# Patient Record
Sex: MALE | Race: WHITE | NOT HISPANIC OR LATINO | Employment: OTHER | URBAN - METROPOLITAN AREA
[De-identification: names, ages, dates, MRNs, and addresses within clinical notes are randomized per-mention and may not be internally consistent; named-entity substitution may affect disease eponyms.]

---

## 2023-10-27 ENCOUNTER — LAB (OUTPATIENT)
Dept: LAB | Facility: LAB | Age: 68
End: 2023-10-27
Payer: COMMERCIAL

## 2023-10-27 DIAGNOSIS — E78.00 PURE HYPERCHOLESTEROLEMIA, UNSPECIFIED: Primary | ICD-10-CM

## 2023-10-27 LAB
ALBUMIN SERPL BCP-MCNC: 4.4 G/DL (ref 3.4–5)
ALP SERPL-CCNC: 36 U/L (ref 33–136)
ALT SERPL W P-5'-P-CCNC: 12 U/L (ref 10–52)
ANION GAP SERPL CALC-SCNC: 13 MMOL/L (ref 10–20)
AST SERPL W P-5'-P-CCNC: 12 U/L (ref 9–39)
BILIRUB SERPL-MCNC: 1.7 MG/DL (ref 0–1.2)
BUN SERPL-MCNC: 14 MG/DL (ref 6–23)
CALCIUM SERPL-MCNC: 9.3 MG/DL (ref 8.6–10.3)
CHLORIDE SERPL-SCNC: 105 MMOL/L (ref 98–107)
CHOLEST SERPL-MCNC: 153 MG/DL (ref 0–199)
CHOLESTEROL/HDL RATIO: 2.4
CO2 SERPL-SCNC: 27 MMOL/L (ref 21–32)
CREAT SERPL-MCNC: 1.13 MG/DL (ref 0.5–1.3)
GFR SERPL CREATININE-BSD FRML MDRD: 71 ML/MIN/1.73M*2
GLUCOSE SERPL-MCNC: 103 MG/DL (ref 74–99)
HDLC SERPL-MCNC: 63.2 MG/DL
LDLC SERPL CALC-MCNC: 70 MG/DL
NON HDL CHOLESTEROL: 90 MG/DL (ref 0–149)
POTASSIUM SERPL-SCNC: 4.5 MMOL/L (ref 3.5–5.3)
PROT SERPL-MCNC: 6.7 G/DL (ref 6.4–8.2)
SODIUM SERPL-SCNC: 140 MMOL/L (ref 136–145)
TRIGL SERPL-MCNC: 100 MG/DL (ref 0–149)
VLDL: 20 MG/DL (ref 0–40)

## 2023-10-27 PROCEDURE — 80053 COMPREHEN METABOLIC PANEL: CPT

## 2023-10-27 PROCEDURE — 80061 LIPID PANEL: CPT

## 2023-10-27 PROCEDURE — 36415 COLL VENOUS BLD VENIPUNCTURE: CPT

## 2023-11-03 ENCOUNTER — APPOINTMENT (OUTPATIENT)
Dept: CARDIOLOGY | Facility: CLINIC | Age: 68
End: 2023-11-03
Payer: COMMERCIAL

## 2023-11-15 PROBLEM — R42 DIZZINESS: Status: ACTIVE | Noted: 2023-11-15

## 2023-11-15 PROBLEM — H93.13 SUBJECTIVE TINNITUS OF BOTH EARS: Status: ACTIVE | Noted: 2023-11-15

## 2023-11-15 PROBLEM — I25.10 CORONARY ARTERIOSCLEROSIS: Status: ACTIVE | Noted: 2023-11-15

## 2023-11-15 PROBLEM — I25.10 CORONARY ARTERIOSCLEROSIS: Chronic | Status: ACTIVE | Noted: 2023-11-15

## 2023-11-15 PROBLEM — H90.3 BILATERAL SENSORINEURAL HEARING LOSS: Status: ACTIVE | Noted: 2023-11-15

## 2023-11-15 PROBLEM — S39.012A ACUTE MYOFASCIAL STRAIN OF LUMBAR REGION: Status: ACTIVE | Noted: 2023-11-15

## 2023-11-15 PROBLEM — R25.1 TREMOR OF BOTH HANDS: Status: ACTIVE | Noted: 2023-11-15

## 2023-11-15 PROBLEM — R42 ORTHOSTATIC LIGHTHEADEDNESS: Status: ACTIVE | Noted: 2023-11-15

## 2023-11-15 PROBLEM — I71.20 THORACIC AORTIC ANEURYSM (CMS-HCC): Chronic | Status: ACTIVE | Noted: 2023-11-15

## 2023-11-15 PROBLEM — I71.20 THORACIC AORTIC ANEURYSM (CMS-HCC): Status: ACTIVE | Noted: 2023-11-15

## 2023-11-18 PROBLEM — R25.1 TREMOR OF BOTH HANDS: Status: RESOLVED | Noted: 2023-11-15 | Resolved: 2023-11-18

## 2023-11-18 PROBLEM — H90.3 BILATERAL SENSORINEURAL HEARING LOSS: Status: RESOLVED | Noted: 2023-11-15 | Resolved: 2023-11-18

## 2023-11-18 PROBLEM — S39.012A ACUTE MYOFASCIAL STRAIN OF LUMBAR REGION: Status: RESOLVED | Noted: 2023-11-15 | Resolved: 2023-11-18

## 2023-11-18 NOTE — PROGRESS NOTES
Referred by No ref. provider found    HPI Dizziness better. No CP/SOB. BP at home in 130-140. Even when with the dizziness, BP stable. Also feels queezy.    Past Medical History:  Problem List Items Addressed This Visit    None       Past Medical History:   Diagnosis Date    Coronary arteriosclerosis 11/15/2023    Elevated calcium score of 227 Agatston units, placing the patient in the 75th percentile for age    Lobulated, fused and horseshoe kidney     Horseshoe kidney    Mixed hyperlipidemia 06/04/2012    Dr. Reid follows. Unable to tolerate higher doses of lipitor due to fatigue. LDL 70.    Overweight     Overweight (BMI 25.0-29.9)    Personal history of other diseases of the digestive system     History of gastroesophageal reflux (GERD)    Personal history of other endocrine, nutritional and metabolic disease     History of high cholesterol    Thoracic aortic aneurysm (CMS/HCC) 11/15/2023    4.3 cm             Past Surgical History:  He has a past surgical history that includes Appendectomy (07/26/2018) and Other surgical history (08/17/2022).      Social History:  He has no history on file for tobacco use, alcohol use, and drug use.    Family History:  No family history on file.     Allergies:  Patient has no known allergies.    Outpatient Medications:  No current outpatient medications     Last Recorded Vitals:  There were no vitals filed for this visit.    Physical Exam    Physical  Patient is alert and oriented x3.  HEENT is unremarkable mucous members are moist  Neck no JVP no bruits upstrokes are full no thyromegaly  Lungs are clear bilaterally.  No wheezing crackles or rales  Heart regular rhythm normal S1-S2 there is no S3 no murmurs are heard.  Abdomen is soft vessels are positive nontender nondistended no organomegaly no pulsatile masses  Extremities have no edema.  Distal pulses present palpable.  Neuro is grossly nonfocal  Skin has no rashes     Last Labs:  CBC -  Lab Results   Component Value Date  "   WBC 7.2 09/11/2021    HGB 15.1 09/11/2021    HCT 45.0 09/11/2021    MCV 93 09/11/2021     09/11/2021       CMP -  Lab Results   Component Value Date    CALCIUM 9.3 10/27/2023    PROT 6.7 10/27/2023    ALBUMIN 4.4 10/27/2023    AST 12 10/27/2023    ALT 12 10/27/2023    ALKPHOS 36 10/27/2023    BILITOT 1.7 (H) 10/27/2023       LIPID PANEL -   Lab Results   Component Value Date    CHOL 153 10/27/2023    HDL 63.2 10/27/2023    CHHDL 2.4 10/27/2023    VLDL 20 10/27/2023    TRIG 100 10/27/2023    NHDL 90 10/27/2023       RENAL FUNCTION PANEL -   Lab Results   Component Value Date    K 4.5 10/27/2023       No results found for: \"BNP\", \"HGBA1C\"  Procedure  CTA [08/06/2022]: Stable aneurysmal dilatation of aortic root 4.2 cm. TAA 4.3cm     ECHO [06/29/2022]: EF 60-65%. SD = impaired relaxation pattern. Thoracic aorta mod dial @ 4.7 cm beyond the sinotubular junction and 4.6 cm at the Sinus of Valsalva.      HOLTER [06/27/2022]: SR, -70. No ep/of A-fib. Ep/of SVT present up to max of 162 pm. This may represent an ectopic atrial tachycardia. No ep/of high-grade AV block. No VT.     CAROTID [12/28/2021]: Less than 50% stenosis proximal CHEPE / LICA      TST [08/02/2018]: Excellent capacity for age. NL nucs 61%     CAC [2014] = 227 Agatston units (75th percentile for age)          Assessment/Plan   1. Dizziness. Resolved. Unclear of cause. Seen by ENT. Still with Tinnitus.  This is better but still happening.  Unclear cause.  Blood pressure stable during these episodes.     2. CAD. Elevated calcium score 277 units. 75th percentile. Continue aspirin, continue atorvastatin. Last stress test 2018 excellent functional capacity for age and normal perfusion.  This to be repeated in the near future.  He will call to review the results.     3. Hyperlipidemia. 10/27/23 LDL 70, HDL 63 these results are excellent.  He will have them repeated November 2024.     4. Thoracic aortic aneurysm. 4.3cm on the CTA. STable since 2014. "  Repeat this November 2024 before he sees me.  This will be done after his fasting blood work      5.  Hypertension.  Blood pressures are reasonable.  I would certainly like him to be lower.  I am encouraging him to decrease his sodium intake and increase his physical activity. He's already lost 9#.     EKg today.  Exercise nuclear stress test in the near future.  Call for results.  Fasting lipids and CMP early November 2024, CTA thoracic aorta later in November 2024, return to see me later November 2024.    Mary Ann Reid MD     Instructions and follow up

## 2023-11-20 ENCOUNTER — OFFICE VISIT (OUTPATIENT)
Dept: CARDIOLOGY | Facility: CLINIC | Age: 68
End: 2023-11-20
Payer: COMMERCIAL

## 2023-11-20 VITALS
BODY MASS INDEX: 27.94 KG/M2 | DIASTOLIC BLOOD PRESSURE: 84 MMHG | HEART RATE: 76 BPM | WEIGHT: 206 LBS | SYSTOLIC BLOOD PRESSURE: 144 MMHG | OXYGEN SATURATION: 99 %

## 2023-11-20 DIAGNOSIS — I25.10 CORONARY ARTERIOSCLEROSIS: Chronic | ICD-10-CM

## 2023-11-20 DIAGNOSIS — I71.21 ANEURYSM OF ASCENDING AORTA WITHOUT RUPTURE (CMS-HCC): Primary | Chronic | ICD-10-CM

## 2023-11-20 DIAGNOSIS — E78.2 MIXED HYPERLIPIDEMIA: Chronic | ICD-10-CM

## 2023-11-20 PROCEDURE — 1160F RVW MEDS BY RX/DR IN RCRD: CPT | Performed by: INTERNAL MEDICINE

## 2023-11-20 PROCEDURE — 93000 ELECTROCARDIOGRAM COMPLETE: CPT | Performed by: INTERNAL MEDICINE

## 2023-11-20 PROCEDURE — 1159F MED LIST DOCD IN RCRD: CPT | Performed by: INTERNAL MEDICINE

## 2023-11-20 PROCEDURE — 1036F TOBACCO NON-USER: CPT | Performed by: INTERNAL MEDICINE

## 2023-11-20 PROCEDURE — 99214 OFFICE O/P EST MOD 30 MIN: CPT | Performed by: INTERNAL MEDICINE

## 2023-11-20 RX ORDER — REGADENOSON 0.08 MG/ML
0.4 INJECTION, SOLUTION INTRAVENOUS
Status: CANCELLED | OUTPATIENT
Start: 2023-11-20

## 2023-11-20 RX ORDER — ATORVASTATIN CALCIUM 20 MG/1
20 TABLET, FILM COATED ORAL DAILY
Qty: 90 TABLET | Refills: 3 | Status: SHIPPED | OUTPATIENT
Start: 2023-11-20 | End: 2024-11-19

## 2023-11-20 RX ORDER — ATORVASTATIN CALCIUM 20 MG/1
20 TABLET, FILM COATED ORAL DAILY
COMMUNITY
Start: 2018-07-09 | End: 2023-11-20 | Stop reason: SDUPTHER

## 2023-11-20 RX ORDER — ASPIRIN 81 MG/1
1 TABLET ORAL DAILY
COMMUNITY

## 2023-11-20 NOTE — PATIENT INSTRUCTIONS
1. Dizziness. Resolved. Unclear of cause. Seen by ENT. Still with Tinnitus.  This is better but still happening.  Unclear cause.  Blood pressure stable during these episodes.     2. CAD. Elevated calcium score 277 units. 75th percentile. Continue aspirin, continue atorvastatin. Last stress test 2018 excellent functional capacity for age and normal perfusion.  This to be repeated in the near future.  He will call to review the results.     3. Hyperlipidemia. 10/27/23 LDL 70, HDL 63 these results are excellent.  He will have them repeated November 2024.     4. Thoracic aortic aneurysm. 4.3cm on the CTA. STable since 2014.  Repeat this November 2024 before he sees me.  This will be done after his fasting blood work     EKg today.  Exercise nuclear stress test in the near future.  Call for results.  Fasting lipids and CMP early November 2024, CTA thoracic aorta later in November 2024, return to see me later November 2024.

## 2023-12-13 ENCOUNTER — HOSPITAL ENCOUNTER (OUTPATIENT)
Dept: RADIOLOGY | Facility: HOSPITAL | Age: 68
Discharge: HOME | End: 2023-12-13
Payer: COMMERCIAL

## 2023-12-13 ENCOUNTER — HOSPITAL ENCOUNTER (OUTPATIENT)
Dept: CARDIOLOGY | Facility: HOSPITAL | Age: 68
Discharge: HOME | End: 2023-12-13
Payer: COMMERCIAL

## 2023-12-13 DIAGNOSIS — I71.20 THORACIC AORTIC ANEURYSM WITHOUT RUPTURE (CMS-HCC): ICD-10-CM

## 2023-12-13 DIAGNOSIS — I71.21 ANEURYSM OF ASCENDING AORTA WITHOUT RUPTURE (CMS-HCC): Chronic | ICD-10-CM

## 2023-12-13 DIAGNOSIS — I25.10 CORONARY ARTERIOSCLEROSIS: Chronic | ICD-10-CM

## 2023-12-13 DIAGNOSIS — E78.5 HYPERLIPIDEMIA, UNSPECIFIED: ICD-10-CM

## 2023-12-13 DIAGNOSIS — E78.2 MIXED HYPERLIPIDEMIA: Chronic | ICD-10-CM

## 2023-12-13 PROCEDURE — 3430000001 HC RX 343 DIAGNOSTIC RADIOPHARMACEUTICALS: Performed by: INTERNAL MEDICINE

## 2023-12-13 PROCEDURE — 93016 CV STRESS TEST SUPVJ ONLY: CPT | Performed by: STUDENT IN AN ORGANIZED HEALTH CARE EDUCATION/TRAINING PROGRAM

## 2023-12-13 PROCEDURE — 93017 CV STRESS TEST TRACING ONLY: CPT

## 2023-12-13 PROCEDURE — 78452 HT MUSCLE IMAGE SPECT MULT: CPT | Performed by: INTERNAL MEDICINE

## 2023-12-13 PROCEDURE — A9502 TC99M TETROFOSMIN: HCPCS | Performed by: INTERNAL MEDICINE

## 2023-12-13 PROCEDURE — 93017 CV STRESS TEST TRACING ONLY: CPT | Mod: MUE

## 2023-12-13 PROCEDURE — 78452 HT MUSCLE IMAGE SPECT MULT: CPT

## 2023-12-13 RX ADMIN — TETROFOSMIN 11.4 MILLICURIE: 0.23 INJECTION, POWDER, LYOPHILIZED, FOR SOLUTION INTRAVENOUS at 09:27

## 2023-12-13 RX ADMIN — TETROFOSMIN 35.4 MILLICURIE: 0.23 INJECTION, POWDER, LYOPHILIZED, FOR SOLUTION INTRAVENOUS at 10:35

## 2024-06-12 ENCOUNTER — TELEPHONE (OUTPATIENT)
Dept: CARDIOLOGY | Facility: CLINIC | Age: 69
End: 2024-06-12
Payer: COMMERCIAL

## 2024-06-12 DIAGNOSIS — E78.2 MIXED HYPERLIPIDEMIA: ICD-10-CM

## 2024-06-12 NOTE — TELEPHONE ENCOUNTER
Mario went off of his statin when he had a colonoscopy on 5/14 but never went back on it. Inna is concerned about his cholesterol and wants to know if he should come in to see Dr. Reid or have labs drawn?

## 2024-06-12 NOTE — TELEPHONE ENCOUNTER
Spoke with wife- advised patient can resume medication and have labs drawn as prev planned- in November.    Wife stated patient actually stopped statin because he feels he is having adverse effects- does not like how he feels in general on medication, has occ dizziness/LH. Was prev on 40mg but did not tolerate and dose was lowered.    Discussed dizziness/LH is not a common side effect of statins. Patient could resume and see if symptoms return or can discuss alternative with Dr. Redi upon his return to office. Wife stated patient would rather try alternative at this time.

## 2024-07-12 RX ORDER — ROSUVASTATIN CALCIUM 10 MG/1
10 TABLET, COATED ORAL DAILY
Qty: 30 TABLET | Refills: 11 | Status: SHIPPED | OUTPATIENT
Start: 2024-07-12 | End: 2025-07-12

## 2024-07-12 NOTE — TELEPHONE ENCOUNTER
Spoke with wife and instructed on new order for statin- Rosuvastatin 10mg.   Understanding verb. Aware to call office if patient develops adverse effects.

## 2024-10-24 ENCOUNTER — TELEPHONE (OUTPATIENT)
Dept: PRIMARY CARE | Facility: CLINIC | Age: 69
End: 2024-10-24
Payer: COMMERCIAL

## 2024-11-18 ENCOUNTER — LAB (OUTPATIENT)
Dept: LAB | Facility: LAB | Age: 69
End: 2024-11-18
Payer: COMMERCIAL

## 2024-11-18 DIAGNOSIS — E78.2 MIXED HYPERLIPIDEMIA: Chronic | ICD-10-CM

## 2024-11-18 DIAGNOSIS — I71.21 ANEURYSM OF ASCENDING AORTA WITHOUT RUPTURE (CMS-HCC): Chronic | ICD-10-CM

## 2024-11-18 DIAGNOSIS — I25.10 CORONARY ARTERIOSCLEROSIS: Chronic | ICD-10-CM

## 2024-11-18 LAB
ALBUMIN SERPL BCP-MCNC: 4.4 G/DL (ref 3.4–5)
ALP SERPL-CCNC: 37 U/L (ref 33–136)
ALT SERPL W P-5'-P-CCNC: 22 U/L (ref 10–52)
ANION GAP SERPL CALC-SCNC: 12 MMOL/L (ref 10–20)
AST SERPL W P-5'-P-CCNC: 19 U/L (ref 9–39)
BILIRUB SERPL-MCNC: 1.7 MG/DL (ref 0–1.2)
BUN SERPL-MCNC: 15 MG/DL (ref 6–23)
CALCIUM SERPL-MCNC: 9.6 MG/DL (ref 8.6–10.3)
CHLORIDE SERPL-SCNC: 105 MMOL/L (ref 98–107)
CHOLEST SERPL-MCNC: 156 MG/DL (ref 0–199)
CHOLESTEROL/HDL RATIO: 2.4
CO2 SERPL-SCNC: 27 MMOL/L (ref 21–32)
CREAT SERPL-MCNC: 1.1 MG/DL (ref 0.5–1.3)
EGFRCR SERPLBLD CKD-EPI 2021: 73 ML/MIN/1.73M*2
GLUCOSE SERPL-MCNC: 95 MG/DL (ref 74–99)
HDLC SERPL-MCNC: 64.7 MG/DL
LDLC SERPL CALC-MCNC: 69 MG/DL
NON HDL CHOLESTEROL: 91 MG/DL (ref 0–149)
POTASSIUM SERPL-SCNC: 4.7 MMOL/L (ref 3.5–5.3)
PROT SERPL-MCNC: 6.6 G/DL (ref 6.4–8.2)
SODIUM SERPL-SCNC: 139 MMOL/L (ref 136–145)
TRIGL SERPL-MCNC: 113 MG/DL (ref 0–149)
VLDL: 23 MG/DL (ref 0–40)

## 2024-11-18 PROCEDURE — 36415 COLL VENOUS BLD VENIPUNCTURE: CPT

## 2024-11-18 PROCEDURE — 80061 LIPID PANEL: CPT

## 2024-11-18 PROCEDURE — 80053 COMPREHEN METABOLIC PANEL: CPT

## 2024-11-20 ENCOUNTER — APPOINTMENT (OUTPATIENT)
Dept: CARDIOLOGY | Facility: CLINIC | Age: 69
End: 2024-11-20
Payer: COMMERCIAL

## 2024-12-18 DIAGNOSIS — E78.2 MIXED HYPERLIPIDEMIA: ICD-10-CM

## 2024-12-18 RX ORDER — ROSUVASTATIN CALCIUM 10 MG/1
10 TABLET, COATED ORAL DAILY
Qty: 90 TABLET | Refills: 3 | Status: SHIPPED | OUTPATIENT
Start: 2024-12-18 | End: 2025-12-18

## 2025-01-02 ENCOUNTER — APPOINTMENT (OUTPATIENT)
Dept: DERMATOLOGY | Facility: CLINIC | Age: 70
End: 2025-01-02
Payer: COMMERCIAL

## 2025-01-02 DIAGNOSIS — L91.8 SKIN TAG: ICD-10-CM

## 2025-01-02 DIAGNOSIS — Z12.83 SCREENING EXAM FOR SKIN CANCER: ICD-10-CM

## 2025-01-02 DIAGNOSIS — L81.4 LENTIGO: ICD-10-CM

## 2025-01-02 DIAGNOSIS — L82.1 SEBORRHEIC KERATOSIS: ICD-10-CM

## 2025-01-02 DIAGNOSIS — D22.9 NEVUS: Primary | ICD-10-CM

## 2025-01-02 PROCEDURE — 1159F MED LIST DOCD IN RCRD: CPT | Performed by: NURSE PRACTITIONER

## 2025-01-02 PROCEDURE — 99203 OFFICE O/P NEW LOW 30 MIN: CPT | Performed by: NURSE PRACTITIONER

## 2025-01-02 PROCEDURE — 1036F TOBACCO NON-USER: CPT | Performed by: NURSE PRACTITIONER

## 2025-01-02 NOTE — PROGRESS NOTES
Subjective     Mario Junior is a 69 y.o. male who presents for the following: Skin Check.   New patient in for full body skin exam.     Review of Systems:  No other skin or systemic complaints other than what is documented elsewhere in the note.    The following portions of the chart were reviewed this encounter and updated as appropriate:       Skin Cancer History  No skin cancer on file.    Specialty Problems    None    Past Medical History:  Mario Junior  has a past medical history of Coronary arteriosclerosis (11/15/2023), Lobulated, fused and horseshoe kidney, Mixed hyperlipidemia (06/04/2012), Overweight, Personal history of other diseases of the digestive system, Personal history of other endocrine, nutritional and metabolic disease, and Thoracic aortic aneurysm (CMS-HCC) (11/15/2023).    Past Surgical History:  Mario Junior  has a past surgical history that includes Appendectomy (07/26/2018) and Other surgical history (08/17/2022).    Family History:  Patient family history is not on file.    Social History:  Mario Junior  reports that he quit smoking about 55 years ago. His smoking use included cigarettes. He started smoking about 58 years ago. He has a 1.5 pack-year smoking history. He has been exposed to tobacco smoke. He has never used smokeless tobacco. No history on file for alcohol use and drug use.    Allergies:  Patient has no known allergies.    Current Medications / CAM's:    Current Outpatient Medications:     aspirin 81 mg EC tablet, Take 1 tablet (81 mg) by mouth once daily., Disp: , Rfl:     rosuvastatin (Crestor) 10 mg tablet, Take 1 tablet (10 mg) by mouth once daily., Disp: 90 tablet, Rfl: 3     Objective   Well appearing patient in no apparent distress; mood and affect are within normal limits.      Assessment/Plan   1. Nevus  Uniform pigmented macule(s)/papule(s) with reassuring findings on dermoscopy    -Discussed nature of condition  -Reassurance, benign-appearing features  on examination today  -Recommend continued observation    2. Lentigo  Tan macules    -Benign appearing on exam  -Reassurance, recommend observation    3. Seborrheic keratosis  Stuck on, waxy macule(s)/papule(s)/plaque(s) with comedo-like openings and milia like cysts    -Discussed nature of condition  -Reassurance, recommend continued observation    4. Screening exam for skin cancer    5. Skin tag (2)  Left Axilla, Right Breast  Flesh colored pedunculated papule(s); there is no clinically evident irritation or inflammation    -Discussed nature of the condition  -Reassurance  -Removal may be performed for an out of pocket fee given cosmetic nature of removal

## 2025-01-21 PROBLEM — R42 DIZZINESS: Status: RESOLVED | Noted: 2023-11-15 | Resolved: 2025-01-21

## 2025-02-09 NOTE — PROGRESS NOTES
Referred by No ref. provider found    HPI Dizziness better. No CP/SOB. BP at home in 130-140. Even when with the dizziness, BP stable. Also feels queezy.    Past Medical History:  Problem List Items Addressed This Visit    None     Past Medical History:   Diagnosis Date    Coronary arteriosclerosis 11/15/2023    Elevated calcium score of 227 Agatston units, placing the patient in the 75th percentile for age    Lobulated, fused and horseshoe kidney     Horseshoe kidney    Mixed hyperlipidemia 06/04/2012    Dr. Reid follows. Unable to tolerate higher doses of lipitor due to fatigue. LDL 70.    Overweight     Overweight (BMI 25.0-29.9)    Personal history of other diseases of the digestive system     History of gastroesophageal reflux (GERD)    Personal history of other endocrine, nutritional and metabolic disease     History of high cholesterol    Thoracic aortic aneurysm (CMS-HCC) 11/15/2023    4.3 cm             Past Surgical History:  He has a past surgical history that includes Appendectomy (07/26/2018) and Other surgical history (08/17/2022).      Social History:  He reports that he quit smoking about 55 years ago. His smoking use included cigarettes. He started smoking about 58 years ago. He has a 1.5 pack-year smoking history. He has been exposed to tobacco smoke. He has never used smokeless tobacco. No history on file for alcohol use and drug use.    Family History:  No family history on file.     Allergies:  Patient has no known allergies.    Outpatient Medications:  Current Outpatient Medications   Medication Instructions    aspirin 81 mg EC tablet 1 tablet, oral, Daily    rosuvastatin (CRESTOR) 10 mg, oral, Daily     Last Recorded Vitals:  There were no vitals filed for this visit.    Physical Exam  Patient is alert and oriented x3.  HEENT is unremarkable mucous members are moist  Neck no JVP no bruits upstrokes are full no thyromegaly  Lungs are clear bilaterally.  No wheezing crackles or rales  Heart  "regular rhythm normal S1-S2 there is no S3 no murmurs are heard.  Abdomen is soft bs are positive nontender nondistended no organomegaly no pulsatile masses  Extremities have no edema.  Distal pulses present palpable.  Neuro is grossly nonfocal  Skin has no rashes     Last Labs:  CBC -  Lab Results   Component Value Date    WBC 7.2 09/11/2021    HGB 15.1 09/11/2021    HCT 45.0 09/11/2021    MCV 93 09/11/2021     09/11/2021     CMP -  Lab Results   Component Value Date    CALCIUM 9.6 11/18/2024    PROT 6.6 11/18/2024    ALBUMIN 4.4 11/18/2024    AST 19 11/18/2024    ALT 22 11/18/2024    ALKPHOS 37 11/18/2024    BILITOT 1.7 (H) 11/18/2024     LIPID PANEL -   Lab Results   Component Value Date    CHOL 156 11/18/2024    HDL 64.7 11/18/2024    CHHDL 2.4 11/18/2024    VLDL 23 11/18/2024    TRIG 113 11/18/2024    NHDL 91 11/18/2024     RENAL FUNCTION PANEL -   Lab Results   Component Value Date    K 4.7 11/18/2024     No results found for: \"BNP\", \"HGBA1C\"  Procedure    TST 12/13/2023 negative EKG above average normal perfusion EF 57%    CTA [08/06/2022]: Stable aneurysmal dilatation of aortic root 4.2 cm. TAA 4.3cm     ECHO [06/29/2022]: EF 60-65%. SD = impaired relaxation pattern. Thoracic aorta mod dial @ 4.7 cm beyond the sinotubular junction and 4.6 cm at the Sinus of Valsalva.      HOLTER [06/27/2022]: SR, -70. No ep/of A-fib. Ep/of SVT present up to max of 162 pm. This may represent an ectopic atrial tachycardia. No ep/of high-grade AV block. No VT.     CAROTID [12/28/2021]: Less than 50% stenosis proximal CHEPE / LICA      TST [08/02/2018]: Excellent capacity for age. NL nucs 61%     CAC [2014] = 227 Agatston units (75th percentile for age)        Assessment/Plan   1. Dizziness. Resolved. Unclear of cause. Seen by ENT. Still with Tinnitus.       2. CAD. Elevated calcium score 277 units. 75th percentile. Continue aspirin, continue atorvastatin.TST 12/13/2023 negative EKG above average normal perfusion EF " 57%     3. Hyperlipidemia. 11/18/2024 LDL 61 HDL 65 triglycerides 113. Followed with PCP.     4. Thoracic aortic aneurysm. 4.3cm on the CTA. STable since 2014.  Repeat not yet done. Will order now. BMP before the CTA.     5.  Hypertension.  BP remains on the higher side. Will add valsartan 80 mg/day. BMP 1 month (corresponding to when he gets his CTA)     EKg today. CTA chest 4-6 wks. Start Valsartan 80 mg/day. BMP 4 wks which will be prior to CTA. Pt to call 1 wk after CT to discuss this and the blood work . He'll let us know what his BP are.     Mary Ann Reid MD     Instructions and follow up

## 2025-02-10 ENCOUNTER — APPOINTMENT (OUTPATIENT)
Dept: CARDIOLOGY | Facility: CLINIC | Age: 70
End: 2025-02-10
Payer: COMMERCIAL

## 2025-02-10 VITALS
SYSTOLIC BLOOD PRESSURE: 142 MMHG | WEIGHT: 218 LBS | DIASTOLIC BLOOD PRESSURE: 84 MMHG | HEART RATE: 70 BPM | OXYGEN SATURATION: 98 % | BODY MASS INDEX: 29.57 KG/M2

## 2025-02-10 DIAGNOSIS — I71.21 ANEURYSM OF ASCENDING AORTA WITHOUT RUPTURE (CMS-HCC): Chronic | ICD-10-CM

## 2025-02-10 DIAGNOSIS — I25.10 CORONARY ARTERIOSCLEROSIS: Primary | Chronic | ICD-10-CM

## 2025-02-10 DIAGNOSIS — E78.2 MIXED HYPERLIPIDEMIA: Chronic | ICD-10-CM

## 2025-02-10 DIAGNOSIS — I71.20 THORACIC AORTIC ANEURYSM (CMS-HCC): Chronic | ICD-10-CM

## 2025-02-10 PROCEDURE — 99214 OFFICE O/P EST MOD 30 MIN: CPT | Performed by: INTERNAL MEDICINE

## 2025-02-10 PROCEDURE — 93005 ELECTROCARDIOGRAM TRACING: CPT | Performed by: INTERNAL MEDICINE

## 2025-02-10 PROCEDURE — 1036F TOBACCO NON-USER: CPT | Performed by: INTERNAL MEDICINE

## 2025-02-10 PROCEDURE — 1159F MED LIST DOCD IN RCRD: CPT | Performed by: INTERNAL MEDICINE

## 2025-02-10 PROCEDURE — 1160F RVW MEDS BY RX/DR IN RCRD: CPT | Performed by: INTERNAL MEDICINE

## 2025-02-10 RX ORDER — VALSARTAN 80 MG/1
80 TABLET ORAL DAILY
Qty: 30 TABLET | Refills: 11 | Status: SHIPPED | OUTPATIENT
Start: 2025-02-10 | End: 2026-02-10

## 2025-02-10 NOTE — PATIENT INSTRUCTIONS
1. Dizziness. Resolved. Unclear of cause. Seen by ENT. Still with Tinnitus.       2. CAD. Elevated calcium score 277 units. 75th percentile. Continue aspirin, continue atorvastatin.TST 12/13/2023 negative EKG above average normal perfusion EF 57%     3. Hyperlipidemia. 11/18/2024 LDL 61 HDL 65 triglycerides 113. Followed with PCP.     4. Thoracic aortic aneurysm. 4.3cm on the CTA. STable since 2014.  Repeat not yet done. Will order now. BMP before the CTA.     5.  Hypertension.  BP remains on the higher side. Will add valsartan 80 mg/day. BMP 1 month (corresponding to when he gets his CTA)     EKg today. CTA chest 4-6 wks. Start Valsartan 80 mg/day. BMP 4 wks which will be prior to CTA. Pt to call 1 wk after CT to discuss this and the blood work . He'll let us know what his BP are.

## 2025-02-16 LAB
ATRIAL RATE: 67 BPM
P AXIS: 15 DEGREES
P OFFSET: 205 MS
P ONSET: 149 MS
PR INTERVAL: 144 MS
Q ONSET: 221 MS
QRS COUNT: 11 BEATS
QRS DURATION: 96 MS
QT INTERVAL: 402 MS
QTC CALCULATION(BAZETT): 424 MS
QTC FREDERICIA: 417 MS
R AXIS: -8 DEGREES
T AXIS: 26 DEGREES
T OFFSET: 422 MS
VENTRICULAR RATE: 67 BPM

## 2025-03-19 LAB
ANION GAP SERPL CALCULATED.4IONS-SCNC: 11 MMOL/L (CALC) (ref 7–17)
BUN SERPL-MCNC: 19 MG/DL (ref 7–25)
BUN/CREAT SERPL: ABNORMAL (CALC) (ref 6–22)
CALCIUM SERPL-MCNC: 9.6 MG/DL (ref 8.6–10.3)
CHLORIDE SERPL-SCNC: 105 MMOL/L (ref 98–110)
CO2 SERPL-SCNC: 25 MMOL/L (ref 20–32)
CREAT SERPL-MCNC: 1.16 MG/DL (ref 0.7–1.35)
EGFRCR SERPLBLD CKD-EPI 2021: 69 ML/MIN/1.73M2
GLUCOSE SERPL-MCNC: 105 MG/DL (ref 65–99)
POTASSIUM SERPL-SCNC: 4.8 MMOL/L (ref 3.5–5.3)
SODIUM SERPL-SCNC: 141 MMOL/L (ref 135–146)

## 2025-03-26 ENCOUNTER — HOSPITAL ENCOUNTER (OUTPATIENT)
Dept: RADIOLOGY | Facility: HOSPITAL | Age: 70
Discharge: HOME | End: 2025-03-26
Payer: MEDICARE

## 2025-03-26 DIAGNOSIS — I25.10 CORONARY ARTERIOSCLEROSIS: Chronic | ICD-10-CM

## 2025-03-26 DIAGNOSIS — I71.21 ANEURYSM OF ASCENDING AORTA WITHOUT RUPTURE: Chronic | ICD-10-CM

## 2025-03-26 PROCEDURE — 2550000001 HC RX 255 CONTRASTS: Performed by: INTERNAL MEDICINE

## 2025-03-26 PROCEDURE — 71275 CT ANGIOGRAPHY CHEST: CPT

## 2025-03-26 RX ADMIN — IOHEXOL 75 ML: 350 INJECTION, SOLUTION INTRAVENOUS at 08:27

## 2025-04-01 ENCOUNTER — TELEPHONE (OUTPATIENT)
Dept: CARDIOLOGY | Facility: CLINIC | Age: 70
End: 2025-04-01
Payer: COMMERCIAL

## 2025-04-01 NOTE — TELEPHONE ENCOUNTER
Wife requesting results of recent labs and CT results. Reviewed results. All questions and concerns addressed.    Last OV 2/10/25-  4. Thoracic aortic aneurysm. 4.3cm on the CTA. STable since 2014.  Repeat not yet done. Will order now. BMP before the CTA.    5.  Hypertension.  BP remains on the higher side. Will add valsartan 80 mg/day. BMP 1 month (corresponding to when he gets his CTA)      BMP 3/19/25  Cr 1.16  BUN 19  Na+ 141  K+ 4.8    CT angio chest 3/26/25  IMPRESSION:  1. The ascending thoracic aorta is dilated measuring 4.4 cm as  maximal diameter which is fairly similar to previous assessment from  CT angio 08/06/2022. Recommend follow-up CT angio of the chest within  12-24 months for further surveillance.  2. There is no evidence for acute aortic pathology.  3. Three-vessel coronary artery calcifications.

## 2025-07-22 ENCOUNTER — TELEPHONE (OUTPATIENT)
Dept: CARDIOLOGY | Facility: CLINIC | Age: 70
End: 2025-07-22
Payer: COMMERCIAL

## 2025-07-22 NOTE — TELEPHONE ENCOUNTER
Wife reports patient has not been feeling like himself for the past several weeks- Patient has been having headaches, fatigue, dizziness, cough. Patient unsure if related to his Valsartan 80mg so he cut the pill in 1/2 past 2 days and feeling better.  BP's 7/20 107/71 . Patient has not checked his BP today. Has been working outside.    Previously 145/82, 125/84 HR 80's    Ok to stay on Valsartan 40mg or would you like alternative?      Last OV 2/10/25  2. CAD. Elevated calcium score 277 units. 75th percentile. Continue aspirin, continue atorvastatin.TST 12/13/2023 negative EKG above average normal perfusion EF 57%     5.  Hypertension.  BP remains on the higher side. Will add valsartan 80 mg/day. BMP 1 month (corresponding to when he gets his CTA)

## 2025-07-23 DIAGNOSIS — I25.10 CORONARY ARTERIOSCLEROSIS: Chronic | ICD-10-CM

## 2025-07-23 DIAGNOSIS — I71.21 ANEURYSM OF ASCENDING AORTA WITHOUT RUPTURE: Chronic | ICD-10-CM

## 2025-07-23 RX ORDER — VALSARTAN 80 MG/1
40 TABLET ORAL DAILY
Qty: 15 TABLET | Refills: 11 | Status: SHIPPED | OUTPATIENT
Start: 2025-07-23 | End: 2026-07-23

## 2026-01-08 ENCOUNTER — APPOINTMENT (OUTPATIENT)
Dept: DERMATOLOGY | Facility: CLINIC | Age: 71
End: 2026-01-08
Payer: COMMERCIAL